# Patient Record
Sex: FEMALE | Race: WHITE
[De-identification: names, ages, dates, MRNs, and addresses within clinical notes are randomized per-mention and may not be internally consistent; named-entity substitution may affect disease eponyms.]

---

## 2017-02-07 ENCOUNTER — HOSPITAL ENCOUNTER (EMERGENCY)
Dept: HOSPITAL 17 - PHED | Age: 47
Discharge: HOME | End: 2017-02-07
Payer: COMMERCIAL

## 2017-02-07 VITALS
RESPIRATION RATE: 18 BRPM | SYSTOLIC BLOOD PRESSURE: 124 MMHG | HEART RATE: 60 BPM | DIASTOLIC BLOOD PRESSURE: 86 MMHG | OXYGEN SATURATION: 96 % | TEMPERATURE: 98.3 F

## 2017-02-07 VITALS
DIASTOLIC BLOOD PRESSURE: 86 MMHG | RESPIRATION RATE: 18 BRPM | HEART RATE: 58 BPM | OXYGEN SATURATION: 96 % | SYSTOLIC BLOOD PRESSURE: 124 MMHG | TEMPERATURE: 98.3 F

## 2017-02-07 VITALS
DIASTOLIC BLOOD PRESSURE: 77 MMHG | OXYGEN SATURATION: 97 % | HEART RATE: 58 BPM | SYSTOLIC BLOOD PRESSURE: 119 MMHG | RESPIRATION RATE: 16 BRPM

## 2017-02-07 VITALS
HEART RATE: 61 BPM | OXYGEN SATURATION: 97 % | SYSTOLIC BLOOD PRESSURE: 126 MMHG | DIASTOLIC BLOOD PRESSURE: 82 MMHG | RESPIRATION RATE: 18 BRPM

## 2017-02-07 VITALS
RESPIRATION RATE: 16 BRPM | OXYGEN SATURATION: 97 % | HEART RATE: 55 BPM | SYSTOLIC BLOOD PRESSURE: 122 MMHG | DIASTOLIC BLOOD PRESSURE: 83 MMHG

## 2017-02-07 VITALS — WEIGHT: 144.4 LBS | HEIGHT: 59 IN | BODY MASS INDEX: 29.11 KG/M2

## 2017-02-07 VITALS — SYSTOLIC BLOOD PRESSURE: 109 MMHG | DIASTOLIC BLOOD PRESSURE: 73 MMHG

## 2017-02-07 DIAGNOSIS — R51: Primary | ICD-10-CM

## 2017-02-07 LAB
ANION GAP SERPL CALC-SCNC: 9 MEQ/L (ref 5–15)
BUN SERPL-MCNC: 14 MG/DL (ref 7–18)
CHLORIDE SERPL-SCNC: 104 MEQ/L (ref 98–107)
GFR SERPLBLD BASED ON 1.73 SQ M-ARVRAT: 72 ML/MIN (ref 89–?)
HCO3 BLD-SCNC: 29.1 MEQ/L (ref 21–32)
POTASSIUM SERPL-SCNC: 4.1 MEQ/L (ref 3.5–5.1)
SODIUM SERPL-SCNC: 142 MEQ/L (ref 136–145)

## 2017-02-07 PROCEDURE — 70450 CT HEAD/BRAIN W/O DYE: CPT

## 2017-02-07 PROCEDURE — 80048 BASIC METABOLIC PNL TOTAL CA: CPT

## 2017-02-07 PROCEDURE — 99284 EMERGENCY DEPT VISIT MOD MDM: CPT

## 2017-02-07 PROCEDURE — 96375 TX/PRO/DX INJ NEW DRUG ADDON: CPT

## 2017-02-07 PROCEDURE — 96374 THER/PROPH/DIAG INJ IV PUSH: CPT

## 2017-02-07 PROCEDURE — 70496 CT ANGIOGRAPHY HEAD: CPT

## 2017-02-07 NOTE — RADHPO
EXAM DATE/TIME:  02/07/2017 07:30 

 

[facility] COMPARISON:     

No previous studies available for comparison.

 

 

INDICATIONS :     

Persistent headache. Evaluate for aneurysm. Family history of cerebral aneurysm.

                      

 

IV CONTRAST:     

85 cc Omnipaque 350 (iohexol) IV 

                      

 

RADIATION DOSE:     

42.01 CTDIvol (mGy) 

 

 

MEDICAL HISTORY :     

Hypercholesterolemia.  

 

SURGICAL HISTORY :      

Appendectomy. Hysterectomy.

 

ENCOUNTER:      

Initial

 

ACUITY:      

2 weeks

 

PAIN SCALE:      

8/10

 

LOCATION:       

Bilateral cranial 

 

TECHNIQUE:     

Volumetric scanning was performed using a multi-row detector CT scanner.  The data was post processed
 with a variety of visualization algorithms including full volume maximum intensity projection, multi
-planar sliding thin slab reformation, curved planar reformation, and surface rendering techniques.  
Using automated exposure control and adjustment of the mA and/or kV according to patient size, radiat
ion dose was kept as low as reasonably achievable to obtain optimal diagnostic quality images. 

 

FINDINGS:     

There is excellent visualization of the major intracranial arteries out to the second-order branch ve
ssels.  There is no evidence for aneurysm, vessel truncation or stenosis, and no evidence for vascula
r malformation.

 

CONCLUSION:     No acute disease.  

 

 

 

 Marky Rodriguez MD on February 07, 2017 at 8:17           

Board Certified Radiologist.

 This report was verified electronically.

## 2017-02-07 NOTE — PD
HPI


Chief Complaint:  Headache


Time Seen by Provider:  05:20


Travel History


International Travel<30 days:  No


Contact w/Intl Traveler<30days:  No


Traveled to known affect area:  No





History of Present Illness


HPI


46-year-old female presents to the emergency department for complaint of 

persistent headache.  Patient has had headache on and off for the past 2 months 

but persistent for the past 2 weeks.  Patient recently moved here from [place] 

where she used to have issues with bronchitis and pneumonia but since moving 

here to [place] is having issues with her sinuses.  Due to courses of 

antibiotic amoxicillin and azithromycin.  Patient denies any fever or chills.  

Headache is not sudden onset thunderclap or worst ever.  Headache is 8/10 in 

intensity.  Headache seems to bother her most daily starting around 4 PM.  She 

does have strong family history of cerebral aneurysm affecting her father and 

multiple family members on her father's side.  Patient also reports both 

parents with significant sinus issues.  Patient is status post hysterectomy.  

Patient has had imaging study in the past to evaluate her aneurysm that has 

been negative.  Patient's last CT was of her sinuses and 0 around the end of 

October and November that did not show any acute sinus disease.





PFSH


Past Medical History


*** Narrative Medical


ADHD arthritis dyslipidemia anxiety headaches immunoglobulin issue appendectomy 

 hysterectomy no tobacco use fh --cerebral aneurysm; nursing notes 

reviewed


ADHD:  Yes


Arthritis:  Yes


Anxiety:  Yes


High Cholesterol:  Yes


Diminished Hearing:  No


GERD:  Yes


Headaches:  Yes


Tetanus Vaccination:  < 5 Years


Influenza Vaccination:  Yes


Pregnant?:  Not Pregnant





Past Surgical History


Abdominal Surgery:  Yes


Appendectomy:  Yes


 Section:  Yes


Gynecologic Surgery:  Yes


Hysterectomy:  Yes





Social History


Alcohol Use:  No


Tobacco Use:  No


Substance Use:  No





Allergies-Medications


(Allergen,Severity, Reaction):  


Coded Allergies:  


     Compazine (Verified  Allergy, Intermediate, Twitching, 17)


     Paxil (Verified  Allergy, Intermediate, Rash, 17)


Reported Meds & Prescriptions





Reported Meds & Active Scripts


Active


Fioricet (Butalbital-Acetaminophen-Caffeine) -40 Mg Cap 1 Cap PO Q4H PRN


Reported


Adderall (Amphetamine-Dextroamphetamine) 5 Mg Tab 5 Mg PO DAILY


     Avoid late evening doses. Space doses at least 4 to 6 hours if more


     than once/day dosing.


Vivelle-Dot Patch 84 HR (Estradiol) 0.05 Mg/24 Hr Patch 1 Patch T-DERMAL 2XWEEK


     Remove old patch and discard when new patch being placed. Change same


     days each week.


Soma (Carisoprodol) 250 Mg Tab 250 Mg PO BID PRN


Alvesco Inh (Ciclesonide Inh) 80 Mcg/Act Aero 80 Mcg INH BID


Protonix (Pantoprazole Sodium) 40 Mg Tab 40 Mg PO BID


Klonopin (Clonazepam) 1 Mg Tab 1 Mg PO TID


Fluoxetine (Fluoxetine HCl) 60 Mg Tab 60 Mg PO DAILY


Atorvastatin (Atorvastatin Calcium) 20 Mg Tab 20 Mg PO HS


Atenolol 25 Mg Tab 25 Mg PO DAILY


Aspirin 81 Mg Tabdr 81 Mg PO DAILY


Proair Hfa 8.5 GM Inh (Albuterol Sulfate) 90 Mcg/Act Aer 2 Puff INH Q4-6H PRN


     108 mcg/actuation








Review of Systems


Except as stated in HPI:  all other systems reviewed are Neg


General / Constitutional:  No: Fever, Chills


Eyes:  Positive: Photophobia,  No: Diploplia, Blurred Vision


HENT:  Positive: Headaches,  No: Vertigo, Neck Stiffness, Neck Pain


Cardiovascular:  No: Chest Pain or Discomfort


Respiratory:  No: Shortness of Breath


Gastrointestinal:  No: Nausea, Vomiting, Abdominal Pain


Musculoskeletal:  No: Cramping


Skin:  No Rash


Neurologic:  Positive: Headache,  No: Weakness, Dizziness, Syncope, Focal 

Abnormalities, Coordination Problem, Change in Mentation, Slurred Speech


Psychiatric:  No: Anxiety


Hematologic/Lymphatic:  No: Lymph Node Enlargement





Physical Exam


Narrative


GENERAL: Well-developed well-nourished female in no acute distress no 

respiratory distress; GCS 15.


SKIN: Warm and dry.


HEAD: Atraumatic. Normocephalic. 


EYES: Pupils equal and round. No scleral icterus. No injection or drainage. 


ENT: No nasal bleeding or discharge.  Mucous membranes pink and moist.


NECK: Trachea midline. No JVD. 


CARDIOVASCULAR: Regular rate and rhythm.  


RESPIRATORY: No accessory muscle use. Clear to auscultation. Breath sounds 

equal bilaterally. 


GASTROINTESTINAL: Abdomen soft, non-tender, nondistended. Hepatic and splenic 

margins not palpable. 


MUSCULOSKELETAL: Extremities without clubbing, cyanosis, or edema. No obvious 

deformities. 


NEUROLOGICAL: Awake and alert. No obvious cranial nerve deficits.  Motor 

grossly within normal limits. Five out of 5 muscle strength in the arms and 

legs.  Normal speech.


PSYCHIATRIC: Appropriate mood and affect; insight and judgment normal.





Data


Data


Last Documented VS





Vital Signs








  Date Time  Temp Pulse Resp B/P Pulse Ox O2 Delivery O2 Flow Rate FiO2


 


17 09:25  65 16 109/73    


 


17 07:06     97 Room Air  


 


17 04:47 98.3       








Orders





 Ct Brain W/O Iv Contrast(Rout) (17 )


^ Saline Lock (17 05:20)


Ondansetron Inj (Zofran Inj) (17 05:30)


Ketorolac Inj (Toradol Inj) (17 05:30)


Basic Metabolic Panel (Bmp) (17 06:26)


Cta Brain W Iv Contrast W 3d (17 )


Iohexol 350 Inj (Omnipaque 350 Inj) (17 07:46)


Ecg Monitoring (17 08:32)


Iv Access Insert/Monitor (17 08:32)


Oximetry (17 08:32)


Sodium Chloride 0.9% Flush (Ns Flush) (17 08:45)


Diphenhydramine Inj (Benadryl Inj) (17 08:45)


Metoclopramide Inj (Reglan Inj) (17 08:45)





Labs





 Laboratory Tests








Test 17





 05:25


 


Sodium Level 142 MEQ/L


 


Potassium Level 4.1 MEQ/L


 


Chloride Level 104 MEQ/L


 


Carbon Dioxide Level 29.1 MEQ/L


 


Anion Gap 9 MEQ/L


 


Blood Urea Nitrogen 14 MG/DL


 


Creatinine 0.84 MG/DL


 


Estimat Glomerular Filtration 72 ML/MIN





Rate 


 


Random Glucose 69 MG/DL


 


Calcium Level 9.1 MG/DL











[facility]


Medical Decision Making


Medical Screen Exam Complete:  Yes


Emergency Medical Condition:  Yes


Medical Record Reviewed:  Yes


Interpretation(s)


CT brain w/o: nad per reading radiologist


Differential Diagnosis


Acute sinusitis allergic rhinitis cephalgia ICH


Narrative Course


CT brain noncontrast administered IV fluids Zofran and Toradol


At 6:35 AM headache is markedly improved "discomfort behind her eyes 6/10 

intensity"


discussed with patient LP for evaluation of intracranial bleed in view of 

headache duration intensity and recurrence.  Patient's last evaluation for 

cerebral aneurysm in view of family history of intracranial bleed and cerebral 

aneurysm prostate 10 years ago; will proceed with CT brain with IV contrast; 

bmp ordered. 


signed over care to oncoming MD for pending CTa


Scripts


Butalbital-Acetaminophen-Caffeine (Fioricet)-40 Mg Cap1 Cap PO Q4H PRN (

HEADACHE) #15 CAP  Ref 0


   Prov:Marky Johnson MD         17








Ermelinda Parry MD 2017 06:32

## 2017-02-07 NOTE — RADHPO
EXAM DATE/TIME:  02/07/2017 05:37 

 

[facility] COMPARISON:     

No previous studies available for comparison.

 

 

INDICATIONS :     

Cephalgia.

                      

 

RADIATION DOSE:     

59.65 CTDIvol (mGy) 

 

 

 

MEDICAL HISTORY :     

None  

 

SURGICAL HISTORY :      

Hysterectomy. 

 

ENCOUNTER:      

Initial

 

ACUITY:      

2 weeks

 

PAIN SCALE:      

5/10

 

LOCATION:       

Bilateral cranial 

 

TECHNIQUE:     

Multiple contiguous axial images were obtained of the head.  Using automated exposure control and adj
ustment of the mA and/or kV according to patient size, radiation dose was kept as low as reasonably a
chievable to obtain optimal diagnostic quality images. 

 

        FINDINGS:

 

CEREBRUM:     

The ventricles are normal for age.  No evidence of midline shift, mass lesion, hemorrhage or acute in
farction.  No extra-axial fluid collections are seen.

 

POSTERIOR FOSSA:     

The cerebellum and brainstem are intact.  The 4th ventricle is midline.  The cerebellopontine angle i
s unremarkable.

 

EXTRACRANIAL:     

The visualized portion of the orbits is intact.

 

SKULL:     

The calvaria is intact.  No evidence of skull fracture.

 

CONCLUSION:     

Normal examination.  

 

 

 

 Clinton Martel MD on February 07, 2017 at 5:59           

Board Certified Radiologist.

 This report was verified electronically.

## 2017-02-07 NOTE — PD
Data


Data


Last Documented VS





Vital Signs








  Date Time  Temp Pulse Resp B/P Pulse Ox O2 Delivery O2 Flow Rate FiO2


 


2/7/17 09:25  65 16 109/73    


 


2/7/17 07:06     97 Room Air  


 


2/7/17 04:47 98.3       








Orders





 Ct Brain W/O Iv Contrast(Rout) (2/7/17 )


^ Saline Lock (2/7/17 05:20)


Ondansetron Inj (Zofran Inj) (2/7/17 05:30)


Ketorolac Inj (Toradol Inj) (2/7/17 05:30)


Basic Metabolic Panel (Bmp) (2/7/17 06:26)


Cta Brain W Iv Contrast W 3d (2/7/17 )


Iohexol 350 Inj (Omnipaque 350 Inj) (2/7/17 07:46)


Ecg Monitoring (2/7/17 08:32)


Iv Access Insert/Monitor (2/7/17 08:32)


Oximetry (2/7/17 08:32)


Sodium Chloride 0.9% Flush (Ns Flush) (2/7/17 08:45)


Diphenhydramine Inj (Benadryl Inj) (2/7/17 08:45)


Metoclopramide Inj (Reglan Inj) (2/7/17 08:45)





Labs





 Laboratory Tests








Test 2/7/17





 05:25


 


Sodium Level 142 MEQ/L


 


Potassium Level 4.1 MEQ/L


 


Chloride Level 104 MEQ/L


 


Carbon Dioxide Level 29.1 MEQ/L


 


Anion Gap 9 MEQ/L


 


Blood Urea Nitrogen 14 MG/DL


 


Creatinine 0.84 MG/DL


 


Estimat Glomerular Filtration 72 ML/MIN





Rate 


 


Random Glucose 69 MG/DL


 


Calcium Level 9.1 MG/DL











[facility]


Supervised Visit with DONOVAN:  No


Narrative Course


Patient care assumed from Dr. Ermelinda Parry at 0700, this patient has had a 

headache for approximately a week has not had a CAT scan as part of her workup.

  She states that her family member also had an aneurysm which ruptured.  Dr. Atwood ordered a CAT scan of the head as well as CT angiogram the brain both 

of which are negative.  Discussed with the patient that these tests are both 

very good excluding subarachnoid hemorrhage benign 100% and the only way to be 

100% us to do a spinal tap/lumbar puncture.  After discussion of risks benefits 

competitions and alternatives of the procedure as well as missing undiagnosed 

sent no bleed she opts to defer lumbar puncture at this time.  Patient's 

headache is somewhat returned while waiting the results of the CTA and she was 

given Benadryl and Reglan which completely relieved her headache.  Discussed 

with her symptomatically management home and return to ED criteria.  Follow-up 

with a primary care physician.


Diagnosis





 Primary Impression:  


 Headache


 Qualified Code:  R51 - Nonintractable headache, unspecified chronicity pattern

, unspecified headache type


Referrals:  


Alvin Murguia MD


***Med/Other Pt SpecificInfo:  Prescription(s) given


Scripts


Butalbital-Acetaminophen-Caffeine (Fioricet)-40 Mg Cap1 Cap PO Q4H PRN (

HEADACHE) #15 CAP  Ref 0


   Prov:Marky Johnson MD         2/7/17


Disposition:  01 DISCHARGE HOME


Condition:  Stable








Marky Johnson MD Feb 7, 2017 07:49

## 2017-12-05 ENCOUNTER — HOSPITAL ENCOUNTER (OUTPATIENT)
Dept: HOSPITAL 17 - PHSDC | Age: 47
Setting detail: OBSERVATION
LOS: 1 days | Discharge: HOME | End: 2017-12-06
Attending: OTOLARYNGOLOGY | Admitting: OTOLARYNGOLOGY
Payer: COMMERCIAL

## 2017-12-05 VITALS
OXYGEN SATURATION: 98 % | HEART RATE: 85 BPM | RESPIRATION RATE: 20 BRPM | DIASTOLIC BLOOD PRESSURE: 91 MMHG | TEMPERATURE: 97.8 F | SYSTOLIC BLOOD PRESSURE: 142 MMHG

## 2017-12-05 VITALS
DIASTOLIC BLOOD PRESSURE: 103 MMHG | SYSTOLIC BLOOD PRESSURE: 151 MMHG | TEMPERATURE: 96.9 F | RESPIRATION RATE: 20 BRPM | OXYGEN SATURATION: 94 % | HEART RATE: 82 BPM

## 2017-12-05 VITALS
SYSTOLIC BLOOD PRESSURE: 145 MMHG | HEART RATE: 81 BPM | TEMPERATURE: 97.6 F | RESPIRATION RATE: 24 BRPM | DIASTOLIC BLOOD PRESSURE: 90 MMHG | OXYGEN SATURATION: 97 %

## 2017-12-05 VITALS — HEART RATE: 80 BPM

## 2017-12-05 VITALS — HEIGHT: 58 IN | WEIGHT: 149.91 LBS | BODY MASS INDEX: 31.47 KG/M2

## 2017-12-05 VITALS — OXYGEN SATURATION: 99 %

## 2017-12-05 DIAGNOSIS — G47.30: ICD-10-CM

## 2017-12-05 DIAGNOSIS — J32.4: ICD-10-CM

## 2017-12-05 DIAGNOSIS — R51: ICD-10-CM

## 2017-12-05 DIAGNOSIS — J34.89: ICD-10-CM

## 2017-12-05 DIAGNOSIS — J34.2: Primary | ICD-10-CM

## 2017-12-05 DIAGNOSIS — J34.3: ICD-10-CM

## 2017-12-05 PROCEDURE — G0378 HOSPITAL OBSERVATION PER HR: HCPCS

## 2017-12-05 PROCEDURE — 30140 RESECT INFERIOR TURBINATE: CPT

## 2017-12-05 PROCEDURE — 96376 TX/PRO/DX INJ SAME DRUG ADON: CPT

## 2017-12-05 PROCEDURE — 30520 REPAIR OF NASAL SEPTUM: CPT

## 2017-12-05 PROCEDURE — 88311 DECALCIFY TISSUE: CPT

## 2017-12-05 PROCEDURE — 96375 TX/PRO/DX INJ NEW DRUG ADDON: CPT

## 2017-12-05 PROCEDURE — 88305 TISSUE EXAM BY PATHOLOGIST: CPT

## 2017-12-05 PROCEDURE — 31256 EXPLORATION MAXILLARY SINUS: CPT

## 2017-12-05 PROCEDURE — 00160 ANES PX NOSE&SINUS NOS: CPT

## 2017-12-05 PROCEDURE — 31276 NSL/SINS NDSC FRNT TISS RMVL: CPT

## 2017-12-05 PROCEDURE — 94762 N-INVAS EAR/PLS OXIMTRY CONT: CPT

## 2017-12-05 PROCEDURE — 96365 THER/PROPH/DIAG IV INF INIT: CPT

## 2017-12-05 PROCEDURE — 31287 NASAL/SINUS ENDOSCOPY SURG: CPT

## 2017-12-05 PROCEDURE — 31255 NSL/SINS NDSC W/TOT ETHMDCT: CPT

## 2017-12-05 PROCEDURE — 96366 THER/PROPH/DIAG IV INF ADDON: CPT

## 2017-12-05 RX ADMIN — HYDROCODONE BITARTRATE AND ACETAMINOPHEN PRN TAB: 5; 325 TABLET ORAL at 17:14

## 2017-12-05 RX ADMIN — SODIUM CHLORIDE SCH MLS/HR: 900 INJECTION INTRAVENOUS at 17:00

## 2017-12-05 RX ADMIN — ONDANSETRON PRN MG: 2 INJECTION, SOLUTION INTRAMUSCULAR; INTRAVENOUS at 19:25

## 2017-12-05 RX ADMIN — FAMOTIDINE SCH MG: 20 TABLET, FILM COATED ORAL at 21:56

## 2017-12-05 RX ADMIN — ONDANSETRON PRN MG: 2 INJECTION, SOLUTION INTRAMUSCULAR; INTRAVENOUS at 19:27

## 2017-12-05 RX ADMIN — HYDROCODONE BITARTRATE AND ACETAMINOPHEN PRN TAB: 5; 325 TABLET ORAL at 13:24

## 2017-12-05 RX ADMIN — BACLOFEN SCH MG: 20 TABLET ORAL at 17:31

## 2017-12-06 VITALS
HEART RATE: 63 BPM | TEMPERATURE: 99.1 F | RESPIRATION RATE: 20 BRPM | SYSTOLIC BLOOD PRESSURE: 149 MMHG | OXYGEN SATURATION: 99 % | DIASTOLIC BLOOD PRESSURE: 87 MMHG

## 2017-12-06 VITALS
HEART RATE: 69 BPM | RESPIRATION RATE: 20 BRPM | DIASTOLIC BLOOD PRESSURE: 96 MMHG | OXYGEN SATURATION: 98 % | SYSTOLIC BLOOD PRESSURE: 138 MMHG | TEMPERATURE: 96.5 F

## 2017-12-06 RX ADMIN — FAMOTIDINE SCH MG: 20 TABLET, FILM COATED ORAL at 08:19

## 2017-12-06 RX ADMIN — HYDROCODONE BITARTRATE AND ACETAMINOPHEN PRN TAB: 5; 325 TABLET ORAL at 08:15

## 2017-12-06 RX ADMIN — HYDROCODONE BITARTRATE AND ACETAMINOPHEN PRN TAB: 5; 325 TABLET ORAL at 04:16

## 2017-12-06 RX ADMIN — SODIUM CHLORIDE SCH MLS/HR: 900 INJECTION INTRAVENOUS at 00:29

## 2017-12-06 RX ADMIN — BACLOFEN SCH MG: 20 TABLET ORAL at 08:17

## 2017-12-06 RX ADMIN — HYDROCODONE BITARTRATE AND ACETAMINOPHEN PRN TAB: 5; 325 TABLET ORAL at 00:25

## 2017-12-06 RX ADMIN — SODIUM CHLORIDE SCH MLS/HR: 900 INJECTION INTRAVENOUS at 08:16

## 2017-12-14 NOTE — MP
cc:

WILL AMANDA M.D.

****

 

 

DATE OF OPERATION

December 5, 2017

 

SURGEON

Dr. Will Amanda

 

PREOPERATIVE DIAGNOSES

1. Nasal airway obstruction.

2. Nasal septal deviation.

3. Hypertrophy of inferior turbinates.

4. Chronic pansinusitis.

5. Chronic sinus headache.

 

POSTOPERATIVE DIAGNOSIS

1. Nasal airway obstruction.

2. Nasal septal deviation.

3. Hypertrophy of inferior turbinates.

4. Chronic pansinusitis.

5. Chronic sinus headache.

 

 

OPERATION PERFORMED

1. Open repair nasal septal fracture.

2. Bilateral submucosal resection of inferior turbinates.

3. Bilateral endoscopic total ethmoidectomy.

4. Bilateral endoscopic exploration of frontal sinus ducts with balloon

     sinuplasty.

5. Bilateral endoscopic maxillary sinusotomy.

6. Bilateral endoscopic sphenoidotomy.

 

INDICATIONS

Documented in the history and physical.

 

DESCRIPTION OF OPERATION

The patient was taken to OR #2 and placed in the supine position.  Following

induction of general anesthesia and intubation the nose was packed bilaterally

with cotton pledgets saturated in 0.05% oxymetazoline.  The nasal septal mucosa

and the inferior turbinates were injected with a total of 8 mL of 1% Xylocaine

with epinephrine 1:100,000.  She was then prepped and draped for surgery.

 

 

The packing was removed and the hemitransfixion incision was made in the left

nasal vestibule and through this incision the septal mucosa was elevated

bilaterally as far as the junction of the bony and cartilaginous septum.  This

revealed the quadrangular cartilage which showed evidence of old septal

fracture and was maximally displaced bilaterally with numerous comminuted

fragments displaced into the nasal airways.  A cumulative area 1.5 x 2 cm was

removed preserving 1.5 cm dorsal and caudal cartilaginous struts.  The mucosa

was then elevated from the bony septum and this was removed with

[place]-Durbni forceps and the Ramana-Richards septal forceps.  This was carried

back as far as the sphenoid rostrum and from the floor of the nose up to within

5-mm of the cribriform plate.  The incision of the septal mucosa was then

closed with a running suture of 4-0 chromic and the mucosal layers of the

septum were approximated to each other with a quilting stitch of 4-0 plain gut.

 

 

The inferior turbinates were addressed next.  They were fractured out medially

and stab incisions were made along their inferior surfaces.  Through these

incisions the submucosal soft tissue was reduced using a curette preserving the

conchal bone.  The incision was then cauterized using the suction Bovie at 35

mosher.  The body of the inferior turbinates was reduced approximately 25%.  The

remnants of the inferior turbinates then we lateralized to the lateral nasal

wall.

 

From this point forward the operation was completed using endoscopic

visualization.  Additional injections lidocaine and epinephrine were made into

the attachment to the middle turbinates as well as in the uncinate processes

and the ethmoid cells.  Addition of 10 mal was injected total bilaterally.  The

left side was addressed first beginning with amputation of the middle turbinate

using through-cutting Blakesley forceps and the power microdebrider.  This

exposed the uncinate process and the ethmoid cells.  The ethmoid cells were

bluntly penetrated with Blakesley forceps and they were exonerated with blunt

and power dissection back as far as the rostrum of the sphenoid.

 

The maxillary ostium was then addressed next.  It was probed with a 3-mm

olive-tip suction and enlarged with Stammberger forceps.  The sphenoid sinus

was then probed to the natural ostium using a #10 suction.  This was used to

bluntly enlarge the ostium and it was enlarged further with upbiting Blakesley

forceps.  Examination with a 0 degrees scope shows the a sinus cavity to be

patent.

 

Lastly, on the left side the frontal sinus exploration was completed.  Using

the Acclarent technique the guidewire was advanced up into the frontal sinus

and the balloon was advanced over the wire.  It was inflated with water to a

pressure of 12 atmospheres at the superior limit of the duct and at the

inferior limit at the junction with the anterior ethmoids.  The balloon was

then removed and inspection of the duct with the 70-degree scope showed it to

be patent all the way into the frontal sinus.

The left sinuses were then irrigated and suctioned and packed with cotton

pledgets saturated in oxymetazoline.  The packs remained in place while the

right side was operated in the same fashion beginning with the amputation of

the middle turbinate, followed by exoneration of anterior and posterior

ethmoids, enlargement of the maxillary sinus ostium and the sphenoid ostium and

then balloon dilation of the frontal sinus duct.  The right side was also

irrigated and packed with cotton pledgets.  These remained in place for a

period of 3 minutes.  All packing was then removed and was replaced with

Stammberger sinus foam.  The inferior nasal vaults were then packed with wound

5.5 cm Rapid Rhino packs each filled with 5 mL of air and the procedure was

terminated.

 

The patient was reversed from anesthesia and taken to Recovery in good

condition.  There were no complications.

 

Blood loss was 300 mL.

 

                              _________________________________

                              MD ROGER Acosta/CIRILO

D:  12/13/2017/2:14 PM

T:  12/14/2017/6:56 AM

Visit #:  [id number]

Job #:  [id number]

## 2018-02-10 ENCOUNTER — HOSPITAL ENCOUNTER (EMERGENCY)
Dept: HOSPITAL 17 - PHED | Age: 48
LOS: 1 days | Discharge: HOME | End: 2018-02-11
Payer: COMMERCIAL

## 2018-02-10 VITALS — WEIGHT: 151.24 LBS | HEIGHT: 58 IN | BODY MASS INDEX: 31.75 KG/M2

## 2018-02-10 VITALS
SYSTOLIC BLOOD PRESSURE: 120 MMHG | HEART RATE: 64 BPM | DIASTOLIC BLOOD PRESSURE: 69 MMHG | RESPIRATION RATE: 20 BRPM | OXYGEN SATURATION: 95 % | TEMPERATURE: 97.9 F

## 2018-02-10 DIAGNOSIS — Z88.8: ICD-10-CM

## 2018-02-10 DIAGNOSIS — B34.9: Primary | ICD-10-CM

## 2018-02-10 DIAGNOSIS — E78.00: ICD-10-CM

## 2018-02-10 DIAGNOSIS — J45.909: ICD-10-CM

## 2018-02-10 DIAGNOSIS — J02.9: ICD-10-CM

## 2018-02-10 LAB
COLOR UR: YELLOW
GLUCOSE UR STRIP-MCNC: (no result) MG/DL
HGB UR QL STRIP: (no result)
KETONES UR STRIP-MCNC: (no result) MG/DL
NITRITE UR QL STRIP: (no result)
SP GR UR STRIP: 1.01 (ref 1–1.03)
SQUAMOUS #/AREA URNS HPF: (no result) /HPF (ref 0–5)
URINE LEUKOCYTE ESTERASE: (no result)

## 2018-02-10 PROCEDURE — 87880 STREP A ASSAY W/OPTIC: CPT

## 2018-02-10 PROCEDURE — 87804 INFLUENZA ASSAY W/OPTIC: CPT

## 2018-02-10 PROCEDURE — 87081 CULTURE SCREEN ONLY: CPT

## 2018-02-10 PROCEDURE — 81001 URINALYSIS AUTO W/SCOPE: CPT

## 2018-02-10 PROCEDURE — 99283 EMERGENCY DEPT VISIT LOW MDM: CPT

## 2018-02-11 VITALS
SYSTOLIC BLOOD PRESSURE: 135 MMHG | TEMPERATURE: 97.8 F | RESPIRATION RATE: 18 BRPM | DIASTOLIC BLOOD PRESSURE: 75 MMHG | HEART RATE: 85 BPM | OXYGEN SATURATION: 98 %

## 2018-02-11 NOTE — PD
HPI


Chief Complaint:  ENT Complaint


Time Seen by Provider:  02:24


Travel History


International Travel<30 days:  No


Contact w/Intl Traveler<30days:  No


Traveled to known affect area:  No





History of Present Illness


HPI


The patient is a 47-year-old female who complains of a sore throat has been 

coughing all week.  She has some sharp, pleuritic chest pain with the coughing.

  She is not short of breath.  She denies any fever.





PFSH


Past Medical History


ADHD:  Yes


Arthritis:  Yes


Asthma:  Yes


Autoimmune Disease:  Yes


Anxiety:  Yes


Depression:  No


Heart Rhythm Problems:  Yes


Cancer:  No


Cardiovascular Problems:  Yes (HEART PALPITATION)


High Cholesterol:  Yes


Diabetes:  No


Diminished Hearing:  No


Endocrine:  No


GERD:  Yes


Genitourinary:  No


Headaches:  Yes


Hepatitis:  No


Hiatal Hernia:  No


Immune Disorder:  Yes


Musculoskeletal:  Yes (BACK & NECK PAIN, DUE TO INJURY)


Neurologic:  Yes


Psychiatric:  Yes (CHRONIC ANXIETY, PANIC DISORDER)


Reproductive:  No


Respiratory:  Yes ((WEARS C-PAP))


Migraines:  Yes


Sleep Apnea:  Yes


Thyroid Disease:  No


LMP:  





Past Surgical History


Abdominal Surgery:  Yes (APPY, )


AICD:  No


Appendectomy:  Yes


Body Medical Devices:  SCREW IN LEFT ANKLE, PINS IN TOES BILAT


Cardiac Surgery:  No


 Section:  Yes


Ear Surgery:  No


Endocrine Surgery:  No


Eye Surgery:  No


Genitourinary Surgery:  No


Gynecologic Surgery:  Yes (TOTAL HYSTERECTOMY)


Hysterectomy:  Yes


Joint Replacement:  No


Oral Surgery:  Yes (REMOVAL WISDOM TEETH)


Pacemaker:  No


Thoracic Surgery:  No





Social History


Alcohol Use:  No


Tobacco Use:  No


Substance Use:  No





Allergies-Medications


(Allergen,Severity, Reaction):  


Coded Allergies:  


     paroxetine (Verified  Allergy, Intermediate, Nausea/Vomiting, 2/10/18)


     prochlorperazine (Unverified  Allergy, Intermediate, Twitching, 2/10/18)


Reported Meds & Prescriptions





Reported Meds & Active Scripts


Active


Fioricet (Butalbital-Acetaminophen-Caffeine) -40 Mg Cap 1 Cap PO Q4H PRN


Reported


Tizanidine (Tizanidine HCl) 4 Mg Cap 4 Mg PO DAILY


Ritalin IR (Methylphenidate HCl) 10 Mg Tab 10 Mg PO DAILY


Ranitidine (Ranitidine HCl) 300 Mg Tab 300 Mg PO DAILY


Metoprolol Succinate ER 24 HR (Metoprolol Succinate) 25 Mg Tab 25 Mg PO DAILY


Divalproex ER (Divalproex Sodium) 500 Mg Tab 500 Mg PO DAILY


Baclofen 20 Mg Tab 20 Mg PO TID


Aspirin EC (Aspirin) 81 Mg Tabdr 81 Mg PO DAILY


Vivelle-Dot Patch 84 HR (Estradiol) 0.05 Mg/24 Hr Patch 1 Patch T-DERMAL 2XWEEK


     Remove old patch and discard when new patch being placed. Change same


     days each week.


Alvesco Inh (Ciclesonide Inh) 80 Mcg/Act Aero 80 Mcg INH BID


Klonopin (Clonazepam) 1 Mg Tab 1 Mg PO TID


Fluoxetine (Fluoxetine HCl) 60 Mg Tab 60 Mg PO DAILY


Atorvastatin (Atorvastatin Calcium) 20 Mg Tab 20 Mg PO HS


Proair Hfa 8.5 GM Inh (Albuterol Sulfate) 90 Mcg/Act Aer 2 Puff INH Q4-6H PRN


     108 mcg/actuation








Review of Systems


Except as stated in HPI:  all other systems reviewed are Neg





Physical Exam


Narrative


GENERAL: Well-nourished, well-developed patient in slight apparent distress 

with her cough and sore throat.  Her vital signs are normal.


SKIN: Focused skin assessment warm/dry.


HEAD: Normocephalic.


EYES: No scleral icterus. No injection or drainage. 


NECK: Supple, trachea midline. No JVD or lymphadenopathy.


CARDIOVASCULAR: Regular rate and rhythm without murmurs, gallops, or rubs. 


RESPIRATORY: Breath sounds equal bilaterally. No accessory muscle use.  Lungs 

clear to auscultation bilaterally.


GASTROINTESTINAL: Abdomen soft, non-tender, nondistended. 


MUSCULOSKELETAL: No cyanosis, or edema. 


BACK: Nontender without obvious deformity. No CVA tenderness.


ENT: The tympanic membranes are clear and the throat is slightly red without 

exudate or abscess.





Data


Data


Last Documented VS





Vital Signs








  Date Time  Temp Pulse Resp B/P (MAP) Pulse Ox O2 Delivery O2 Flow Rate FiO2


 


2/10/18 22:03 97.9 64 20 120/69 (86) 95   








Orders





 Orders


Influenzae A/B Antigen (2/10/18 23:25)


Strep Culture (Group A) (2/10/18 23:25)


Urinalysis - C+S If Indicated (2/10/18 23:25)


Group A Rapid Strep Screen (18 00:09)





Labs





Laboratory Tests








Test


  2/10/18


23:30


 


Urine Color YELLOW 


 


Urine Turbidity SLIGHT 


 


Urine pH 7.5 


 


Urine Specific Gravity 1.012 


 


Urine Protein NEG mg/dL 


 


Urine Glucose (UA) NEG mg/dL 


 


Urine Ketones NEG mg/dL 


 


Urine Occult Blood NEG 


 


Urine Nitrite NEG 


 


Urine Bilirubin NEG 


 


Urine Leukocyte Esterase NEG 


 


Urine Squamous Epithelial


Cells 0-5 /hpf 


 


 


Microscopic Urinalysis Comment


  CULT NOT


INDICATED











MDM


Medical Decision Making


Medical Screen Exam Complete:  Yes


Emergency Medical Condition:  Yes


Medical Record Reviewed:  Yes


Interpretation(s)


The group A strep antigen is negative for group A strep antigen.  Urinalysis is 

normal.  The influenza A/B antigen is negative for flu a and flu B antigen.


Differential Diagnosis


Viral pharyngitis, strep pharyngitis, viral bronchitis, pneumonia, flu syndrome

, urinary tract infection


Narrative Course


The patient appears to have a viral pharyngitis.  She should benefit from 

prednisone 20 mg twice daily for 5 days and codeine containing cough syrup.





Diagnosis





 Primary Impression:  


 Viral syndrome


 Additional Impression:  


 Viral pharyngitis





***Additional Instructions:  


Warm saltwater gargles often help with 1/2 teaspoon of salt to a large glass of 

water.  Follow-up next week with your primary care physician.  The prednisone 

is one tablet twice daily for 5 days.  Do not drink alcohol or drive on the 

codeine consenting cough syrup until you know how it affects you.


***Med/Other Pt SpecificInfo:  Prescription(s) given


Scripts


Guaifenesin-Codeine Liq (Guaifenesin AC Liq) 100-10 Mg/5 Ml Syrp


10 ML PO Q4H Y for COUGH, #1 BOTTLE 0 Refills


   Prov: Avtar Mcdonald MD         18 


Prednisone (Prednisone) 20 Mg Tab


20 MG PO BID for 5 Days, #10 TAB 0 Refills


   Prov: Avtar Mcdonald MD         18


Disposition:  01 DISCHARGE HOME


Condition:  Stable











Avtar Mcdonald MD 2018 02:45

## 2018-06-20 ENCOUNTER — HOSPITAL ENCOUNTER (OUTPATIENT)
Dept: HOSPITAL 17 - HRAD | Age: 48
Discharge: HOME | End: 2018-06-20
Attending: UROLOGY
Payer: COMMERCIAL

## 2018-06-20 VITALS
SYSTOLIC BLOOD PRESSURE: 99 MMHG | OXYGEN SATURATION: 97 % | DIASTOLIC BLOOD PRESSURE: 58 MMHG | RESPIRATION RATE: 16 BRPM | HEART RATE: 65 BPM

## 2018-06-20 VITALS
HEART RATE: 61 BPM | SYSTOLIC BLOOD PRESSURE: 92 MMHG | OXYGEN SATURATION: 96 % | RESPIRATION RATE: 18 BRPM | DIASTOLIC BLOOD PRESSURE: 60 MMHG

## 2018-06-20 VITALS
SYSTOLIC BLOOD PRESSURE: 115 MMHG | RESPIRATION RATE: 18 BRPM | OXYGEN SATURATION: 93 % | HEART RATE: 65 BPM | DIASTOLIC BLOOD PRESSURE: 78 MMHG

## 2018-06-20 VITALS
HEART RATE: 68 BPM | DIASTOLIC BLOOD PRESSURE: 44 MMHG | RESPIRATION RATE: 18 BRPM | SYSTOLIC BLOOD PRESSURE: 101 MMHG | OXYGEN SATURATION: 96 %

## 2018-06-20 VITALS
SYSTOLIC BLOOD PRESSURE: 102 MMHG | OXYGEN SATURATION: 98 % | RESPIRATION RATE: 18 BRPM | DIASTOLIC BLOOD PRESSURE: 68 MMHG | HEART RATE: 62 BPM

## 2018-06-20 VITALS
RESPIRATION RATE: 18 BRPM | OXYGEN SATURATION: 97 % | DIASTOLIC BLOOD PRESSURE: 64 MMHG | SYSTOLIC BLOOD PRESSURE: 95 MMHG | HEART RATE: 58 BPM

## 2018-06-20 VITALS — BODY MASS INDEX: 30.08 KG/M2 | WEIGHT: 143.3 LBS | HEIGHT: 58 IN

## 2018-06-20 VITALS
OXYGEN SATURATION: 97 % | RESPIRATION RATE: 20 BRPM | HEART RATE: 61 BPM | DIASTOLIC BLOOD PRESSURE: 74 MMHG | SYSTOLIC BLOOD PRESSURE: 103 MMHG | TEMPERATURE: 98.6 F

## 2018-06-20 VITALS
SYSTOLIC BLOOD PRESSURE: 91 MMHG | DIASTOLIC BLOOD PRESSURE: 48 MMHG | RESPIRATION RATE: 18 BRPM | HEART RATE: 68 BPM | OXYGEN SATURATION: 95 %

## 2018-06-20 DIAGNOSIS — I49.9: ICD-10-CM

## 2018-06-20 DIAGNOSIS — I25.10: ICD-10-CM

## 2018-06-20 DIAGNOSIS — Z01.818: ICD-10-CM

## 2018-06-20 DIAGNOSIS — G47.30: ICD-10-CM

## 2018-06-20 DIAGNOSIS — N28.1: Primary | ICD-10-CM

## 2018-06-20 LAB
BASOPHILS # BLD AUTO: 0 TH/MM3 (ref 0–0.2)
BASOPHILS NFR BLD: 0.4 % (ref 0–2)
EOSINOPHIL # BLD: 0.3 TH/MM3 (ref 0–0.4)
EOSINOPHIL NFR BLD: 4.9 % (ref 0–4)
ERYTHROCYTE [DISTWIDTH] IN BLOOD BY AUTOMATED COUNT: 13.4 % (ref 11.6–17.2)
HCT VFR BLD CALC: 41 % (ref 35–46)
HGB BLD-MCNC: 13.5 GM/DL (ref 11.6–15.3)
INR PPP: 1 RATIO
LYMPHOCYTES # BLD AUTO: 3.1 TH/MM3 (ref 1–4.8)
LYMPHOCYTES NFR BLD AUTO: 43.5 % (ref 9–44)
MCH RBC QN AUTO: 31.1 PG (ref 27–34)
MCHC RBC AUTO-ENTMCNC: 33 % (ref 32–36)
MCV RBC AUTO: 94.3 FL (ref 80–100)
MONOCYTE #: 0.8 TH/MM3 (ref 0–0.9)
MONOCYTES NFR BLD: 11.3 % (ref 0–8)
NEUTROPHILS # BLD AUTO: 2.8 TH/MM3 (ref 1.8–7.7)
NEUTROPHILS NFR BLD AUTO: 39.9 % (ref 16–70)
PLATELET # BLD: 264 TH/MM3 (ref 150–450)
PMV BLD AUTO: 7.8 FL (ref 7–11)
PROTHROMBIN TIME: 10.4 SEC (ref 9.8–11.6)
RBC # BLD AUTO: 4.35 MIL/MM3 (ref 4–5.3)
WBC # BLD AUTO: 7.1 TH/MM3 (ref 4–11)

## 2018-06-20 PROCEDURE — 77012 CT SCAN FOR NEEDLE BIOPSY: CPT

## 2018-06-20 PROCEDURE — 82570 ASSAY OF URINE CREATININE: CPT

## 2018-06-20 PROCEDURE — 99152 MOD SED SAME PHYS/QHP 5/>YRS: CPT

## 2018-06-20 PROCEDURE — 99153 MOD SED SAME PHYS/QHP EA: CPT

## 2018-06-20 PROCEDURE — 85610 PROTHROMBIN TIME: CPT

## 2018-06-20 PROCEDURE — 85730 THROMBOPLASTIN TIME PARTIAL: CPT

## 2018-06-20 PROCEDURE — 50390 DRAINAGE OF KIDNEY LESION: CPT

## 2018-06-20 PROCEDURE — C1729 CATH, DRAINAGE: HCPCS

## 2018-06-20 PROCEDURE — 74150 CT ABDOMEN W/O CONTRAST: CPT

## 2018-06-20 PROCEDURE — 88112 CYTOPATH CELL ENHANCE TECH: CPT

## 2018-06-20 PROCEDURE — 88305 TISSUE EXAM BY PATHOLOGIST: CPT

## 2018-06-20 PROCEDURE — 85025 COMPLETE CBC W/AUTO DIFF WBC: CPT

## 2018-06-20 PROCEDURE — 99151 MOD SED SAME PHYS/QHP <5 YRS: CPT

## 2018-06-20 NOTE — RADRPT
EXAM DATE:  6/20/2018 12:08 PM EDT

AGE/SEX:        47 years / Female



INDICATIONS:  Abdominal pain after right renal aspiration



CLINICAL DATA:  This is the patient's initial encounter. Patient reports that signs and symptoms have
 been present for 1 day and indicates a pain score of 6/10. 

                                                                          

MEDICAL/SURGICAL HISTORY:       Cardiovascular disease. Appendectomy.  Hysterectomy.



ORAL CONTRAST:   No oral contrast ingested.



RADIATION DOSE:  6.76 CTDI (mGy)







COMPARISON:      [facility], CT ABDOMEN AND PELVIS W AND W/O CONTRAST, 5/31/2018.  [facility], CT GUIDED RENAL CYST
 ASPIR/INJ, 6/20/2018.  . 





TECHNIQUE:  Multiple contiguous axial images were obtained through the abdomen. Images were obtained 
using multiple row detector helical technique. No oral contrast ingested. Using automated exposure co
ntrol and adjustment of the mA and/or kV according to patient size, radiation dose was kept as low as
 reasonably achievable to obtain optimal diagnostic quality images.  DICOM format image data is avail
able electronically for review and comparison. Lack of IV contrast limits the diagnosis for certain o
rgan pathology.



FINDINGS: 

Lower Lungs: The visualized lower lungs are clear.

Liver: The liver has a homogeneous density without space-occupying lesion. There is no dilation of th
e biliary tree. Gallbladder unremarkable.

Spleen:  Homogeneous density without enlargement.

Pancreas:  Unremarkable without mass or calcification.

Kidneys:  Normal in size and shape. No evidence of mass or hydronephrosis. The previously noted large
 right renal cyst has been completely drained. There is no evidence of a perinephric hematoma. There 
is no free fluid or loculated fluid collections in the right renal fossa.

Adrenal Glands:   Unremarkable.

Aorta/Retroperitoneum:  The aorta is grossly unremarkable without aneurysmal dilation. No paraaortic 
or retrocrural adenopathy.

Bowel/Mesentery:  The bowel loops are grossly unremarkable. 

Abdominal Wall:  Intact.

Bony Structures:  Unremarkable.



CONCLUSION:

1.  Status post complete drainage of the previously noted prominent right renal cyst.

2.  Otherwise, the CT scan of the abdomen is unremarkable.



Electronically signed by: Ravi Bellamy MD  6/20/2018 12:14 PM EDT

## 2018-06-20 NOTE — RADRPT
EXAM DATE:  6/20/2018 1:25 PM EDT

AGE/SEX:        47 years / Female



INDICATIONS:  Right renal cyst aspiration.



CLINICAL DATA:  This is the patient's initial encounter. Patient reports that signs and symptoms have
 been present for 1 day and indicates a pain score of 0/10. 

                                                                          

MEDICAL/SURGICAL HISTORY:   Cardiovascular disease. Appendectomy.  Hysterectomy.



RADIATION DOSE: CTDI (mGy)







COMPARISON:      No prior exams available for comparison. 





PROCEDURE :



The risks, benefits and alternatives to the procedure were explained and verbal and written consent w
as obtained.  Using automated exposure control and adjustment of the mA and/or kV according to patien
t size, radiation dose was kept as low as reasonably achievable to obtain optimal diagnostic quality 
images.  The site was prepped in sterile fashion.  Full sterile technique was used, including cap, ma
sk, sterile gloves and gown and a large sterile sheet.  Hand hygiene and 2% chlorhexidine and/or beta
dine/alcohol prep was utilized per protocol for cutaneous antisepsis.  The skin and subcutaneous tiss
ues were infiltrated with local anesthetic solution.  DICOM format image data is available electronic
ally for review and comparison.  



FINDINGS: 

Under CT guidance 6 [place] catheter was placed in the cystic mass right kidney. 70 cc of clear straw-
colored fluid removed and sent for cytology. This apparent cyst was sclerosed with Betadine. Follow-u
p CT scan reveals no hemorrhage or residual cyst.



CONCLUSION:  

1.  Uncomplicated drainage and sclerosis of a right renal cyst.

2.  Patient was encouraged to keep a pain walled to see if this is indeed the source of her pain.



Electronically signed by: Jean-Paul Pace MD  6/20/2018 1:39 PM EDT